# Patient Record
Sex: MALE | Race: WHITE | Employment: FULL TIME | URBAN - METROPOLITAN AREA
[De-identification: names, ages, dates, MRNs, and addresses within clinical notes are randomized per-mention and may not be internally consistent; named-entity substitution may affect disease eponyms.]

---

## 2017-12-06 ENCOUNTER — HOSPITAL ENCOUNTER (EMERGENCY)
Age: 27
Discharge: HOME | End: 2017-12-06
Attending: EMERGENCY MEDICINE
Payer: SELF-PAY

## 2017-12-06 ENCOUNTER — APPOINTMENT (OUTPATIENT)
Dept: GENERAL RADIOLOGY | Age: 27
End: 2017-12-06
Attending: NURSE PRACTITIONER
Payer: SELF-PAY

## 2017-12-06 VITALS
HEIGHT: 73 IN | HEART RATE: 87 BPM | RESPIRATION RATE: 17 BRPM | TEMPERATURE: 98.8 F | BODY MASS INDEX: 27.83 KG/M2 | DIASTOLIC BLOOD PRESSURE: 81 MMHG | SYSTOLIC BLOOD PRESSURE: 156 MMHG | WEIGHT: 210 LBS | OXYGEN SATURATION: 99 %

## 2017-12-06 DIAGNOSIS — S61.411A LACERATION OF RIGHT PALM, INITIAL ENCOUNTER: Primary | ICD-10-CM

## 2017-12-06 LAB
HIV1 P24 AG SERPL QL IA: NONREACTIVE
HIV1+2 AB SERPL QL IA: NONREACTIVE

## 2017-12-06 PROCEDURE — 90471 IMMUNIZATION ADMIN: CPT | Performed by: NURSE PRACTITIONER

## 2017-12-06 PROCEDURE — 73130 X-RAY EXAM OF HAND: CPT

## 2017-12-06 PROCEDURE — 87389 HIV-1 AG W/HIV-1&-2 AB AG IA: CPT | Performed by: NURSE PRACTITIONER

## 2017-12-06 PROCEDURE — 75810000293 HC SIMP/SUPERF WND  RPR: Performed by: NURSE PRACTITIONER

## 2017-12-06 PROCEDURE — 77030002916 HC SUT ETHLN J&J -A: Performed by: NURSE PRACTITIONER

## 2017-12-06 PROCEDURE — 99283 EMERGENCY DEPT VISIT LOW MDM: CPT | Performed by: NURSE PRACTITIONER

## 2017-12-06 RX ORDER — CEPHALEXIN 500 MG/1
500 CAPSULE ORAL 3 TIMES DAILY
Qty: 21 CAP | Refills: 0 | Status: SHIPPED | OUTPATIENT
Start: 2017-12-06 | End: 2017-12-13

## 2017-12-06 NOTE — ED NOTES
I have reviewed discharge instructions with the patient. The patient verbalized understanding. Patient left ED via Discharge Method: ambulatory to Home with (under custody of law enforcement ). Opportunity for questions and clarification provided. Patient given 1 scripts. To continue your aftercare when you leave the hospital, you may receive an automated call from our care team to check in on how you are doing. This is a free service and part of our promise to provide the best care and service to meet your aftercare needs.  If you have questions, or wish to unsubscribe from this service please call [phone]. Thank you for Choosing our [facility] Emergency Department.

## 2017-12-06 NOTE — ED PROVIDER NOTES
HPI Comments: Patient presents with laceration to the right garcia side of his hand. He states he was at work when he cute his hand on metal box. He states he \"thinks\" his last tetanus shot was 3-4 years ago but he is unsure. He denies numbness, decrease range of motion in fingers, and coolness to his fingers. No bleeding noted at this time. Patient requesting HIV testing prior to discharge. He states the person he was working with may have HIV and he \"may have touched blood' from a cut from the person he was working with. Patient is a 32 y.o. male presenting with skin laceration. The history is provided by the patient. Laceration    The incident occurred 3 to 5 hours ago. The laceration is located on the right hand. The laceration is 3 cm in size. The injury mechanism is a metal edge. Foreign body present: no. The pain is at a severity of 4/10. The pain is mild. Pertinent negatives include no numbness, no tingling, no weakness, no loss of motion, no coolness and no discoloration. It is unknown when the patient last had a tetanus shot. History reviewed. No pertinent past medical history. Past Surgical History:   Procedure Laterality Date    HX ORTHOPAEDIC           History reviewed. No pertinent family history. Social History     Social History    Marital status: SINGLE     Spouse name: N/A    Number of children: N/A    Years of education: N/A     Occupational History    Not on file. Social History Main Topics    Smoking status: Never Smoker    Smokeless tobacco: Never Used    Alcohol use Not on file    Drug use: Not on file    Sexual activity: Not on file     Other Topics Concern    Not on file     Social History Narrative    No narrative on file         ALLERGIES: Review of patient's allergies indicates no known allergies. Review of Systems   Constitutional: Negative for chills and fever. Respiratory: Negative for cough and shortness of breath.     Cardiovascular: Negative for chest pain. Gastrointestinal: Negative for abdominal pain, constipation, diarrhea, nausea and vomiting. Genitourinary: Negative for difficulty urinating. Musculoskeletal: Positive for myalgias. Negative for arthralgias. Skin: Positive for wound. Neurological: Negative for dizziness, tingling, weakness and numbness. Vitals:    12/06/17 1248   BP: 156/81   Pulse: 87   Resp: 17   Temp: 98.8 °F (37.1 °C)   SpO2: 99%   Weight: 95.3 kg (210 lb)   Height: 6' 1\" (1.854 m)            Physical Exam   Constitutional: He is oriented to person, place, and time. No distress. Cardiovascular: Normal rate and regular rhythm. No murmur heard. Pulmonary/Chest: Effort normal and breath sounds normal.   Abdominal: Soft. There is no tenderness. Musculoskeletal:        Right hand: He exhibits tenderness and laceration. He exhibits normal range of motion and normal capillary refill. Normal sensation noted. Normal strength noted. Hands:  Neurological: He is alert and oriented to person, place, and time. Skin: Skin is warm and dry. Laceration noted. He is not diaphoretic. No pallor. Psychiatric: He has a normal mood and affect. His behavior is normal.   Nursing note and vitals reviewed. Xr Hand Rt Min 3 V    Result Date: 12/6/2017  Right hand 12/6/2017 INDICATION:  Laceration 3 days earlier with trauma to third metacarpal. 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 Osseous cortical margins intact. No unexpected soft tissue foreign bodies. No acute fracture identified.      IMPRESSION:  No acute bony findings     Recent Results (from the past 12 hour(s))   HIV-1,2 P24 AG/AB SCREEN    Collection Time: 12/06/17  2:17 PM   Result Value Ref Range    p24 Antigen NONREACTIVE NR      HIV-1,2 Ab NONREACTIVE NR         MDM  Number of Diagnoses or Management Options  Laceration of right palm, initial encounter:   Diagnosis management comments: Xray negative for foreign body and for acute bony fracture. Wound repaired and dressed. Patient given tetanus shot. Rapid HIV non reactive. Patient given prescription for keflex. Patient left ED with law enforcement. Amount and/or Complexity of Data Reviewed  Tests in the radiology section of CPT®: ordered and reviewed    Patient Progress  Patient progress: stable    ED Course       Wound Repair  Date/Time: 12/6/2017 5:03 PM  Performed by: NPSupervising provider: jolie  Preparation: skin prepped with Betadine  Pre-procedure re-eval: Immediately prior to the procedure, the patient was reevaluated and found suitable for the planned procedure and any planned medications. Time out: Immediately prior to the procedure a time out was called to verify the correct patient, procedure, equipment, staff and marking as appropriate. .  Location details: right hand  Wound length:2.6 - 7.5 cm  Anesthesia: local infiltration    Anesthesia:  Local Anesthetic: lidocaine 1% without epinephrine  Anesthetic total: 2 mL  Foreign bodies: no foreign bodies  Irrigation solution: saline  Irrigation method: syringe  Debridement: none  Skin closure: 4-0 nylon  Number of sutures: 6  Technique: simple  Approximation: close  Dressing: 4x4 and antibiotic ointment  Patient tolerance: Patient tolerated the procedure well with no immediate complications  My total time at bedside, performing this procedure was 1-15 minutes.

## 2017-12-06 NOTE — ED TRIAGE NOTES
Pt c/o laceration to right hand. States he cut it on a metal bin. Bleeding controlled on arrival. Pt states last T-Day was 3-4 years ago.

## 2017-12-06 NOTE — Clinical Note
Sutures out in 10 days. Keep area clean dry and covered. Medication as prescribed Follow up with your primary care provider or return to the Emergency Department for any new or worse symptoms.